# Patient Record
Sex: MALE | Race: WHITE
[De-identification: names, ages, dates, MRNs, and addresses within clinical notes are randomized per-mention and may not be internally consistent; named-entity substitution may affect disease eponyms.]

---

## 2019-01-03 ENCOUNTER — HOSPITAL ENCOUNTER (EMERGENCY)
Dept: HOSPITAL 12 - ER | Age: 69
Discharge: HOME | End: 2019-01-03
Payer: MEDICARE

## 2019-01-03 VITALS — HEIGHT: 73 IN | BODY MASS INDEX: 27.04 KG/M2 | WEIGHT: 204 LBS

## 2019-01-03 DIAGNOSIS — M79.602: Primary | ICD-10-CM

## 2019-01-03 DIAGNOSIS — E78.5: ICD-10-CM

## 2019-01-03 DIAGNOSIS — Z79.899: ICD-10-CM

## 2019-01-03 NOTE — NUR
Patient discharged to home in stable conditon.  Written and verbal after care 
instructions given to patient. Patient verbalizes understanding of 
instructions. CD copy & official radiologist's findings were given per 
patient's request.